# Patient Record
Sex: MALE | Race: AMERICAN INDIAN OR ALASKA NATIVE
[De-identification: names, ages, dates, MRNs, and addresses within clinical notes are randomized per-mention and may not be internally consistent; named-entity substitution may affect disease eponyms.]

---

## 2017-10-04 NOTE — CAT SCAN REPORT
CT of the cervical spine including axial, reformatted sagittal and 

coronal images.



History: Neck trauma/seizure.



Findings: There is no evidence of fracture or subluxation. Multilevel 

cervical spondylosis is present, most pronounced at C6-7. The odontoid 

is intact.



Impression: No acute findings.

## 2017-10-04 NOTE — CAT SCAN REPORT
CT HEAD WITHOUT CONTRAST:



HISTORY:  Seizure.



Serial contiguous axial images were obtained through the cranium.

Intravenous contrast material was not administered.  The ventricles are 

normal in size and appearance.  There is no mass effect or midline 

shift.  No areas of abnormally increased or decreased attenuation are 

seen.  No mass lesion is seen.



The mastoid air cells and visualized portions of the sinuses are

normal.



IMPRESSION:

Cranial CT scan within normal limits.

## 2017-10-04 NOTE — EMERGENCY DEPARTMENT REPORT
ED General Adult HPI





- General


Chief complaint: Seizure


Stated complaint: UNRESPONSIVE


Time Seen by Provider: 10/04/17 11:59


Source: patient, RN notes reviewed, old records reviewed


Mode of arrival: Stretcher


Limitations: Altered Mental Status, Other (patient is intoxicated.  He is a 

poor historian.)





- History of Present Illness


Initial comments: 





This is a 52-year-old male, the patient is previously unknown to this provider.

  The patient is stable after being found unresponsive in a hospital elevator, 

may have had a seizure.  Patient indicates that he wants detox from alcohol, 

apparently he drank earlier on today.  The patient denies homicidality and 

suicidality.  The patient denies access to guns and firearms, and he denies 

hallucinations.  He complains of central chest pain, which does not radiate to 

the back, arms or neck, denies shortness of breath, vomiting diaphoresis, there 

is no leg pain, there is no leg swelling, no recent trips, no recent surgeries.

  Patient is unable to describe his exacerbating or relieving factors.


-: unknown


Consistency: constant


Improves with: none


Worsens with: none


Associated Symptoms: confusion, chest pain, loss of appetite, weakness





- Related Data


 Previous Rx's











 Medication  Instructions  Recorded  Last Taken  Type


 


Phenytoin Sodium Extended 300 mg PO QDAY #30 capsule 10/04/17 Unknown Rx


 


chlordiazePOXIDE [Librium] 25 mg PO Q8H PRN #15 capsule 10/04/17 Unknown Rx











 Allergies











Allergy/AdvReac Type Severity Reaction Status Date / Time


 


No Known Allergies Allergy   Unverified 10/04/17 11:28














ED Review of Systems


ROS: 


Stated complaint: UNRESPONSIVE


Other details as noted in HPI





Comment: Unobtainable due to pts medical conditions





ED Past Medical Hx





- Past Medical History


Previous Medical History?: Yes


Hx Seizures: Yes





- Surgical History


Past Surgical History?: No





- Social History


Smoking Status: Current Every Day Smoker


Substance Use Type: Alcohol





- Medications


Home Medications: 


 Home Medications











 Medication  Instructions  Recorded  Confirmed  Last Taken  Type


 


Phenytoin Sodium Extended 300 mg PO QDAY #30 capsule 10/04/17  Unknown Rx


 


chlordiazePOXIDE [Librium] 25 mg PO Q8H PRN #15 capsule 10/04/17  Unknown Rx














ED Physical Exam





- General


Limitations: Altered Mental Status


General appearance: appears intoxicated





- Head


Head exam: Present: atraumatic, normocephalic





- Eye


Eye exam: Present: normal appearance, PERRL, EOMI.  Absent: nystagmus





- ENT


ENT exam: Present: normal exam, normal orophraynx, mucous membranes moist, TM's 

normal bilaterally, normal external ear exam, other (there is no nasal septal 

hematoma)





- Neck


Neck exam: Present: normal inspection, full ROM.  Absent: tenderness, 

meningismus





- Respiratory


Respiratory exam: Present: normal lung sounds bilaterally.  Absent: respiratory 

distress, wheezes, rales, rhonchi, stridor, chest wall tenderness, accessory 

muscle use, decreased breath sounds, prolonged expiratory





- Cardiovascular


Cardiovascular Exam: Present: regular rate, normal rhythm, normal heart sounds.

  Absent: bradycardia, tachycardia, irregular rhythm, systolic murmur, 

diastolic murmur, rubs, gallop





- GI/Abdominal


GI/Abdominal exam: Present: soft, normal bowel sounds.  Absent: distended, 

tenderness, guarding, rebound, rigid, pulsatile mass





- Rectal


Rectal exam: Present: deferred





- Extremities Exam


Extremities exam: Present: normal inspection, full ROM, normal capillary 

refill.  Absent: pedal edema, joint swelling, calf tenderness





- Back Exam


Back exam: Present: normal inspection, full ROM.  Absent: tenderness, CVA 

tenderness (R), CVA tenderness (L), muscle spasm, paraspinal tenderness, 

vertebral tenderness





- Neurological Exam


Neurological exam: Present: other (there is no facial droop.  There is 5 out of 

5 strength bilateral upper and lower extremities.  The tongue is midline.)





- Psychiatric


Psychiatric exam: Present: flat affect.  Absent: homicidal ideation, suicidal 

ideation





- Skin


Skin exam: Present: warm, dry, intact, normal color.  Absent: rash





ED Course


 Vital Signs











  10/04/17 10/04/17 10/04/17





  11:28 11:38 16:32


 


Temperature 98.1 F  97.9 F


 


Pulse Rate 88  97 H


 


Respiratory 18 16 15





Rate   


 


Blood Pressure 118/72  


 


Blood Pressure   97/59





[Left]   


 


O2 Sat by Pulse 95 98 100





Oximetry   














  10/04/17





  18:19


 


Temperature 98.6 F


 


Pulse Rate 75


 


Respiratory 16





Rate 


 


Blood Pressure 


 


Blood Pressure 118/72





[Left] 


 


O2 Sat by Pulse 99





Oximetry 














- Reevaluation(s)


Reevaluation #1: 





10/04/17 13:43


Differential diagnosis: Intracranial injury, cervical spine injury, alcohol 

withdrawal seizure, convulsion, electrolyte derangement, pneumonia, urinary 

tract infection











Assessment and plan: 52-year-old male who is intoxicated, was found in an 

elevator, may have had a seizure.  No active tongue fasciculations, no active 

tremors, patient is quite intoxicated at this time.  Noncontrast CT scan of the 

brain and cervical spine negative.  Laboratory studies unremarkable, x-ray of 

the chest unremarkable, urinalysis unremarkable, most likely the patient is 

simply drunk.  I see no indication for antiepileptic drug therapy at this time, 

patient will be observed on a cardiac monitor until he is clinically sober.  

Repeat EKG, 


repeat troponin ordered,





By history no pulmonary embolus or DVT risk factors, low risk by well's 

criteria.





/17 13:43





Reevaluation #2: 





10/04/17 14:45


Patient now more awake.  Requesting to eat.  Patient indicates he takes 

phenytoin/Dilantin.  Patient indicates he takes 300 mg.  Level is added.





Patient has been in the ER for a few hours without any issues with convulsion 

or seizures.  Troponins are negative 2.  Repeat EKG is morphologically 

unchanged from prior EKG.


Reevaluation #3: 





10/04/17 15:45


Troponin negative 2, repeat level and phenytoin level pending.  Blood alcohol 

level was 0.24 at approximately 12:00 PM, therefore, anticipate clinical 

sobriety at approximately 6:00 PM.  Patient sleeping comfortably, no distress.





Care is transferred to the oncoming physician, Dr. Garcia, to follow-up on 

repeat blood alcohol level, repeat troponin, and Dilantin level.  Thus far, 

patient has not had any convulsive activity.  The patient will also be 

evaluated by crisis team/mental health to evaluate for suitability for detox.





ED Medical Decision Making





- Lab Data


Result diagrams: 


 10/04/17 12:18





 10/04/17 11:47








 Vital Signs











  10/04/17 10/04/17





  11:28 11:38


 


Temperature 98.1 F 


 


Pulse Rate 88 


 


Respiratory 18 16





Rate  


 


Blood Pressure 118/72 


 


O2 Sat by Pulse 95 98





Oximetry  














 Lab Results











  10/04/17 10/04/17 10/04/17 Range/Units





  11:47 11:47 11:47 


 


WBC    9.6  (4.5-11.0)  K/mm3


 


RBC    4.89  (3.65-5.03)  M/mm3


 


Hgb    15.4 H  (11.8-15.2)  gm/dl


 


Hct    45.8 H  (35.5-45.6)  %


 


MCV    94  (84-94)  fl


 


MCH    31  (28-32)  pg


 


MCHC    34  (32-34)  %


 


RDW    16.3 H  (13.2-15.2)  %


 


Plt Count    207  (140-440)  K/mm3


 


Lymph % (Auto)    20.4  (13.4-35.0)  %


 


Mono % (Auto)    8.1 H  (0.0-7.3)  %


 


Eos % (Auto)    0.4  (0.0-4.3)  %


 


Baso % (Auto)    1.1  (0.0-1.8)  %


 


Lymph #    2.0  (1.2-5.4)  K/mm3


 


Mono #    0.8  (0.0-0.8)  K/mm3


 


Eos #    0.0  (0.0-0.4)  K/mm3


 


Baso #    0.1  (0.0-0.1)  K/mm3


 


Seg Neutrophils %    70.0  (40.0-70.0)  %


 


Seg Neutrophils #    6.7  (1.8-7.7)  K/mm3


 


PT     (12.2-14.9)  Sec.


 


INR     (0.87-1.13)  


 


APTT     (24.2-36.6)  Sec.


 


Sodium  142    (137-145)  mmol/L


 


Potassium  3.7    (3.6-5.0)  mmol/L


 


Chloride  101.9    ()  mmol/L


 


Carbon Dioxide  23    (22-30)  mmol/L


 


Anion Gap  21    mmol/L


 


BUN  12    (9-20)  mg/dL


 


Creatinine  0.7 L    (0.8-1.5)  mg/dL


 


Estimated GFR  > 60    ml/min


 


BUN/Creatinine Ratio  17    %


 


Glucose  110 H    ()  mg/dL


 


Calcium  9.0    (8.4-10.2)  mg/dL


 


Magnesium     (1.7-2.3)  mg/dL


 


Total Creatine Kinase     ()  units/L


 


Troponin T     (0.00-0.029)  ng/mL


 


Urine Color     (Yellow)  


 


Urine Turbidity     (Clear)  


 


Urine pH     (5.0-7.0)  


 


Ur Specific Gravity     (1.003-1.030)  


 


Urine Protein     (Negative)  mg/dL


 


Urine Glucose (UA)     (Negative)  mg/dL


 


Urine Ketones     (Negative)  mg/dL


 


Urine Blood     (Negative)  


 


Urine Nitrite     (Negative)  


 


Urine Bilirubin     (Negative)  


 


Urine Urobilinogen     (<2.0)  mg/dL


 


Ur Leukocyte Esterase     (Negative)  


 


Urine WBC (Auto)     (0.0-6.0)  /HPF


 


Urine RBC (Auto)     (0.0-6.0)  /HPF


 


U Epithel Cells (Auto)     (0-13.0)  /HPF


 


Urine Mucus     /HPF


 


Urine Opiates Screen     


 


Urine Methadone Screen     


 


Ur Barbiturates Screen     


 


Ur Phencyclidine Scrn     


 


Ur Amphetamines Screen     


 


U Benzodiazepines Scrn     


 


Urine Cocaine Screen     


 


U Marijuana (THC) Screen     


 


Drugs of Abuse Note     


 


Plasma/Serum Alcohol   0.24 H   (0-0.07)  gm%














  10/04/17 10/04/17 10/04/17 Range/Units





  12:03 12:03 12:18 


 


WBC    9.5  (4.5-11.0)  K/mm3


 


RBC    4.77  (3.65-5.03)  M/mm3


 


Hgb    15.3 H  (11.8-15.2)  gm/dl


 


Hct    44.3  (35.5-45.6)  %


 


MCV    93  (84-94)  fl


 


MCH    32  (28-32)  pg


 


MCHC    35 H  (32-34)  %


 


RDW    16.5 H  (13.2-15.2)  %


 


Plt Count    202  (140-440)  K/mm3


 


Lymph % (Auto)     (13.4-35.0)  %


 


Mono % (Auto)     (0.0-7.3)  %


 


Eos % (Auto)     (0.0-4.3)  %


 


Baso % (Auto)     (0.0-1.8)  %


 


Lymph #     (1.2-5.4)  K/mm3


 


Mono #     (0.0-0.8)  K/mm3


 


Eos #     (0.0-0.4)  K/mm3


 


Baso #     (0.0-0.1)  K/mm3


 


Seg Neutrophils %     (40.0-70.0)  %


 


Seg Neutrophils #     (1.8-7.7)  K/mm3


 


PT     (12.2-14.9)  Sec.


 


INR     (0.87-1.13)  


 


APTT     (24.2-36.6)  Sec.


 


Sodium     (137-145)  mmol/L


 


Potassium     (3.6-5.0)  mmol/L


 


Chloride     ()  mmol/L


 


Carbon Dioxide     (22-30)  mmol/L


 


Anion Gap     mmol/L


 


BUN     (9-20)  mg/dL


 


Creatinine     (0.8-1.5)  mg/dL


 


Estimated GFR     ml/min


 


BUN/Creatinine Ratio     %


 


Glucose     ()  mg/dL


 


Calcium     (8.4-10.2)  mg/dL


 


Magnesium     (1.7-2.3)  mg/dL


 


Total Creatine Kinase     ()  units/L


 


Troponin T     (0.00-0.029)  ng/mL


 


Urine Color  Yellow    (Yellow)  


 


Urine Turbidity  Clear    (Clear)  


 


Urine pH  5.0    (5.0-7.0)  


 


Ur Specific Gravity  1.009    (1.003-1.030)  


 


Urine Protein  <15 mg/dl    (Negative)  mg/dL


 


Urine Glucose (UA)  Neg    (Negative)  mg/dL


 


Urine Ketones  Neg    (Negative)  mg/dL


 


Urine Blood  Neg    (Negative)  


 


Urine Nitrite  Neg    (Negative)  


 


Urine Bilirubin  Neg    (Negative)  


 


Urine Urobilinogen  < 2.0    (<2.0)  mg/dL


 


Ur Leukocyte Esterase  Neg    (Negative)  


 


Urine WBC (Auto)  3.0    (0.0-6.0)  /HPF


 


Urine RBC (Auto)  4.0    (0.0-6.0)  /HPF


 


U Epithel Cells (Auto)  < 1.0    (0-13.0)  /HPF


 


Urine Mucus  Few    /HPF


 


Urine Opiates Screen   Presumptive negative   


 


Urine Methadone Screen   Presumptive negative   


 


Ur Barbiturates Screen   Presumptive negative   


 


Ur Phencyclidine Scrn   Presumptive negative   


 


Ur Amphetamines Screen   Presumptive negative   


 


U Benzodiazepines Scrn   Presumptive negative   


 


Urine Cocaine Screen   Presumptive positive   


 


U Marijuana (THC) Screen   Presumptive positive   


 


Drugs of Abuse Note   Disclamer   


 


Plasma/Serum Alcohol     (0-0.07)  gm%














  10/04/17 10/04/17 Range/Units





  12:18 12:18 


 


WBC    (4.5-11.0)  K/mm3


 


RBC    (3.65-5.03)  M/mm3


 


Hgb    (11.8-15.2)  gm/dl


 


Hct    (35.5-45.6)  %


 


MCV    (84-94)  fl


 


MCH    (28-32)  pg


 


MCHC    (32-34)  %


 


RDW    (13.2-15.2)  %


 


Plt Count    (140-440)  K/mm3


 


Lymph % (Auto)    (13.4-35.0)  %


 


Mono % (Auto)    (0.0-7.3)  %


 


Eos % (Auto)    (0.0-4.3)  %


 


Baso % (Auto)    (0.0-1.8)  %


 


Lymph #    (1.2-5.4)  K/mm3


 


Mono #    (0.0-0.8)  K/mm3


 


Eos #    (0.0-0.4)  K/mm3


 


Baso #    (0.0-0.1)  K/mm3


 


Seg Neutrophils %    (40.0-70.0)  %


 


Seg Neutrophils #    (1.8-7.7)  K/mm3


 


PT  13.6   (12.2-14.9)  Sec.


 


INR  0.99   (0.87-1.13)  


 


APTT  28.8   (24.2-36.6)  Sec.


 


Sodium    (137-145)  mmol/L


 


Potassium    (3.6-5.0)  mmol/L


 


Chloride    ()  mmol/L


 


Carbon Dioxide    (22-30)  mmol/L


 


Anion Gap    mmol/L


 


BUN    (9-20)  mg/dL


 


Creatinine    (0.8-1.5)  mg/dL


 


Estimated GFR    ml/min


 


BUN/Creatinine Ratio    %


 


Glucose    ()  mg/dL


 


Calcium    (8.4-10.2)  mg/dL


 


Magnesium   2.00  (1.7-2.3)  mg/dL


 


Total Creatine Kinase   266 H  ()  units/L


 


Troponin T   < 0.010  (0.00-0.029)  ng/mL


 


Urine Color    (Yellow)  


 


Urine Turbidity    (Clear)  


 


Urine pH    (5.0-7.0)  


 


Ur Specific Gravity    (1.003-1.030)  


 


Urine Protein    (Negative)  mg/dL


 


Urine Glucose (UA)    (Negative)  mg/dL


 


Urine Ketones    (Negative)  mg/dL


 


Urine Blood    (Negative)  


 


Urine Nitrite    (Negative)  


 


Urine Bilirubin    (Negative)  


 


Urine Urobilinogen    (<2.0)  mg/dL


 


Ur Leukocyte Esterase    (Negative)  


 


Urine WBC (Auto)    (0.0-6.0)  /HPF


 


Urine RBC (Auto)    (0.0-6.0)  /HPF


 


U Epithel Cells (Auto)    (0-13.0)  /HPF


 


Urine Mucus    /HPF


 


Urine Opiates Screen    


 


Urine Methadone Screen    


 


Ur Barbiturates Screen    


 


Ur Phencyclidine Scrn    


 


Ur Amphetamines Screen    


 


U Benzodiazepines Scrn    


 


Urine Cocaine Screen    


 


U Marijuana (THC) Screen    


 


Drugs of Abuse Note    


 


Plasma/Serum Alcohol    (0-0.07)  gm%














- EKG Data


-: EKG Interpreted by Me





- EKG Data





10/04/17 13:45


Normal sinus, 85 bpm, normal axis, normal intervals, poor R-wave progression in 

V2, not morphologically consistent with STEMI.


10/04/17 14:45


Repeat EKG is unremarkable/unchanged when compared to prior.

















- Radiology Data


Radiology results: report reviewed, image reviewed





X-ray of the chest is negative.





Noncontrast CT scan of the brain and cervical spine negative.


Critical care attestation.: 


If time is entered above; I have spent that time in minutes in the direct care 

of this critically ill patient, excluding procedure time.








ED Disposition


Clinical Impression: 


 Alcohol abuse





Disposition: DC-01 TO HOME OR SELFCARE


Is pt being admited?: No


Does the pt Need Aspirin: No


Condition: Stable


Instructions:  Alcohol Withdrawal (ED)


Additional Instructions: 


Do not drive a car or operate motor vehicles for the next 6 months.  Follow up 

with outpatient primary care doctor within the next 2 weeks.  Follow up with 

outpatient mental health resources/[facility] department/mental health 

department within the next 2 weeks.  Return to the ER right away with fevers, 

chills, chest pain, shortness of breath, intractable nausea or vomiting, 

confusion, inability to tolerate liquid feeds.  Follow up with either primary 

care or cardiology within the next 2 weeks for chest pain.











Prescriptions: 


chlordiazePOXIDE [Librium] 25 mg PO Q8H PRN #15 capsule


 PRN Reason: Alcohol Withdrawal


Phenytoin Sodium Extended 300 mg PO QDAY #30 capsule


Referrals: 


PRIMARY MD JAMES [Primary Care Provider] - 3-5 Days


ASPEN CARDOZA MD [Staff Physician] - 3-5 Days


[facility] [Provider Group] - 3-5 Days


[facility] [Provider Group] - 3-5 Days

## 2017-10-04 NOTE — EMERGENCY DEPARTMENT REPORT
Blank Doc





- Documentation


Documentation: 


I was signed out this patient by my colleague, Dr. Marcum, with the 

instructions to follow-up with a repeat troponin and blood alcohol level.  The 

blood alcohol was drawn again here around 6 PM and came back at 0.12.  It is 

now close to 7:30 and the patient will be at a level consistent with the legal 

limits.  He is seen ambulatory, AAO 3 and does not appear to be in any 

distress.  His third troponin came back negative as well.  He says that someone 

is coming to get him from the emergency department.  These reasons he appears 

safe for discharge and will be given the discharge instructions and 

prescriptions per Dr. Marcum.

## 2018-01-31 NOTE — EMERGENCY DEPARTMENT REPORT
HPI





- General


Chief Complaint: Extremity Injury, Lower


Time Seen by Provider: 01/31/18 22:24





- HPI


HPI: 


This is a 52 year-old  male presents to the emergency 

department with 2 different complaints.  First, the patient says that he has 

right ankle pain has been going on for the past week when he slipped on the ice 

and rolled his foot/ankle.  It has been getting impressively worse, it is 

swollen and he has trouble walking on it and/or bearing weight.  Secondarily, 

the patient is asking for some assistance for alcohol detox.  He says he last 

drank this morning because he was trying to treat the shakes.  He does have a 

history of seizures.  He denies any illicit drug use.  He does not have a 

primary care physician.  He was dropped off today by someone from an [facility] program.








ED Past Medical Hx





- Past Medical History


Previous Medical History?: No


Hx Seizures: Yes





- Surgical History


Past Surgical History?: No





- Social History


Smoking Status: Current Every Day Smoker


Substance Use Type: Alcohol





- Medications


Home Medications: 


 Home Medications











 Medication  Instructions  Recorded  Confirmed  Last Taken  Type


 


Phenytoin Sodium Extended 300 mg PO QDAY #30 capsule 10/04/17  Unknown Rx


 


chlordiazePOXIDE [Librium] 25 mg PO Q8H PRN #18 capsule 02/01/18  Unknown Rx














ED Review of Systems


ROS: 


Stated complaint: RIGHT ANKLE PAIN, MEDICAL CLEARANCE


Other details as noted in HPI





Comment: All other systems reviewed and negative


Constitutional: denies: chills, fever


Eyes: denies: eye pain, eye discharge, vision change


ENT: denies: ear pain, throat pain


Respiratory: denies: cough, shortness of breath, wheezing


Cardiovascular: denies: chest pain, palpitations


Gastrointestinal: denies: abdominal pain, nausea, diarrhea


Genitourinary: denies: urgency, dysuria


Musculoskeletal: joint swelling, arthralgia


Skin: denies: rash, lesions


Neurological: denies: headache, weakness, paresthesias





Physical Exam





- Physical Exam


Vital Signs: 


 Vital Signs











  01/31/18





  12:09


 


Temperature 98.9 F


 


Pulse Rate 117 H


 


Respiratory 16





Rate 


 


Blood Pressure 121/62


 


O2 Sat by Pulse 96





Oximetry 











Physical Exam: 


GENERAL: The patient is well-developed well-nourished.


HENT: Normocephalic.  Atraumatic.    Patient has moist mucous membranes.


EYES: Extraocular motions are intact.  Pupils equal reactive to light 

bilaterally.


NECK: Supple.  Trachea is midline.


CHEST/LUNGS: Clear to auscultation.  There is no respiratory distress noted.


HEART/CARDIOVASCULAR: Regular.  There is mild tachycardia.  There is no murmur.


ABDOMEN: Abdomen is soft, nontender.  Patient has normal bowel sounds.  There 

is no abdominal distention.


SKIN: Skin is warm and dry.  Mild right ankle circumferential swelling.


NEURO: The patient is awake, alert, and oriented.  The patient is cooperative.  

The patient has no focal neurologic deficits.  The patient has normal speech.


MUSCULOSKELETAL: Tenderness to palpation to the right ankle.  Decreased range 

of motion of the right foot and ankle secondary to pain.








ED Course


 Vital Signs











  01/31/18





  12:09


 


Temperature 98.9 F


 


Pulse Rate 117 H


 


Respiratory 16





Rate 


 


Blood Pressure 121/62


 


O2 Sat by Pulse 96





Oximetry 














ED Medical Decision Making





- Lab Data


Result diagrams: 


 01/31/18 23:22





 01/31/18 23:22





- Radiology Data


Radiology results: image reviewed


interpreted by me: 


X-ray of the right ankle shows a distal fibular fracture








- Medical Decision Making


Patient had 2 complaints.  Regarding the ankle pain, he has a distal fibular 

fracture.  He is neurovascularly intact.  Placed in a splint and placed on 

crutches and will be given referral for orthopedist.





Regarding his alcohol abuse and detox request, the patient was seen by the 

psych  and there is no way for us to directly sent him as an admit for 

a alcohol detox and he was given referrals for multiple different centers for 

help with his rehabilitation and/or detox.  The patient had a blood alcohol 

level of 0.19 at about 11:30 PM.  He was under the legal limit by the time he 

was discharged at 6 AM.  He had some minor tremors but was still very low on a 

CIWA score.  Since we are not able to rectally send him to a detox center, he 

was given a 4 day taper of Librium to help him with alcohol withdrawal.  He 

understands that Librium itself can be sedating and has side effects of 

respiratory depression if mixed with alcohol and that he should stop the 

medication if he decides not to continue with his detox.





Labs were otherwise unremarkable.  Patient will return to the ER with any 

worsening of symptoms or any acute distress.








- Differential Diagnosis


alcohol intoxication, alcohol withdrawal, ankle fracture, ankle sprain


Critical Care Time: No


Critical care attestation.: 


If time is entered above; I have spent that time in minutes in the direct care 

of this critically ill patient, excluding procedure time.








ED Disposition


Clinical Impression: 


 Alcohol abuse





Alcohol dependence


Qualifiers:


 Substance use status: in withdrawal Complication of substance-induced condition

: uncomplicated Qualified Code(s): F10.230 - Alcohol dependence with withdrawal

, uncomplicated





Fracture of distal fibula


Qualifiers:


 Encounter type: initial encounter Fracture type: closed Fracture morphology: 

unspecified fracture morphology Laterality: right Qualified Code(s): S82.831A - 

Other fracture of upper and lower end of right fibula, initial encounter for 

closed fracture





Disposition: DC-01 TO HOME OR SELFCARE


Is pt being admited?: No


Condition: Stable


Instructions:  Ankle Fracture (ED), Alcohol Intoxication (ED), Alcohol 

Withdrawal (ED)


Additional Instructions: 


I have given you a referral for 2 different orthopedists regarding your ankle 

fracture, Dr. Baker and the [facility] orthopedic group.  You have also been 

given 3 different referrals to help you with your alcohol dependence and 

withdrawal.  I am starting you on a short Librium taper to help you with the 

withdrawal.  This medication cannot be mixed with alcohol as it can cause 

respiratory depression and sedation.  If you are unable to stop drinking alcohol

, then do not take this medication.





Return to the emergency Department with any worsening of your symptoms or any 

acute distress.


Prescriptions: 


chlordiazePOXIDE [Librium] 25 mg PO Q8H PRN #18 capsule


 PRN Reason: Alcohol Withdrawal


Referrals: 


[facility] [Provider Group] - 3-5 Days


PRIMARY CARE,MD [Primary Care Provider] - 3-5 Days


CHRISTOPHER BAKER MD [Staff Physician] - 3-5 Days

## 2018-01-31 NOTE — XRAY REPORT
RIGHT ANKLE, 3 views: 



History: Pain and swelling.



An oblique nondisplaced fracture is identified in the distal fibula 

just proximal to the ankle joint. No calcified callus is identified. 

The remaining bony structures are intact. The ankle mortise is within 

normal limits. There is moderate lateral soft tissue swelling.



IMPRESSION:

Distal fibular fracture.

## 2021-03-09 ENCOUNTER — HOSPITAL ENCOUNTER (INPATIENT)
Dept: HOSPITAL 5 - ED | Age: 56
LOS: 2 days | Discharge: LEFT BEFORE DISCHARGE | DRG: 894 | End: 2021-03-11
Attending: INTERNAL MEDICINE | Admitting: INTERNAL MEDICINE
Payer: COMMERCIAL

## 2021-03-09 DIAGNOSIS — F10.231: Primary | ICD-10-CM

## 2021-03-09 DIAGNOSIS — R56.9: ICD-10-CM

## 2021-03-09 DIAGNOSIS — F17.200: ICD-10-CM

## 2021-03-09 DIAGNOSIS — E11.9: ICD-10-CM

## 2021-03-09 LAB
ALBUMIN SERPL-MCNC: 4.3 G/DL (ref 3.9–5)
ALT SERPL-CCNC: 16 UNITS/L (ref 7–56)
BASOPHILS # (AUTO): 0.1 K/MM3 (ref 0–0.1)
BASOPHILS NFR BLD AUTO: 1.1 % (ref 0–1.8)
BILIRUB DIRECT SERPL-MCNC: 0.2 MG/DL (ref 0–0.2)
BUN SERPL-MCNC: 9 MG/DL (ref 9–20)
BUN/CREAT SERPL: 13 %
CALCIUM SERPL-MCNC: 9.4 MG/DL (ref 8.4–10.2)
EOSINOPHIL # BLD AUTO: 0.1 K/MM3 (ref 0–0.4)
EOSINOPHIL NFR BLD AUTO: 1.6 % (ref 0–4.3)
HCT VFR BLD CALC: 45.3 % (ref 35.5–45.6)
HEMOLYSIS INDEX: 974
HGB BLD-MCNC: 15.9 GM/DL (ref 11.8–15.2)
LYMPHOCYTES # BLD AUTO: 2.1 K/MM3 (ref 1.2–5.4)
LYMPHOCYTES NFR BLD AUTO: 23.8 % (ref 13.4–35)
MCHC RBC AUTO-ENTMCNC: 35 % (ref 32–34)
MCV RBC AUTO: 98 FL (ref 84–94)
MONOCYTES # (AUTO): 0.3 K/MM3 (ref 0–0.8)
MONOCYTES % (AUTO): 3.7 % (ref 0–7.3)
PLATELET # BLD: 416 K/MM3 (ref 140–440)
RBC # BLD AUTO: 4.6 M/MM3 (ref 3.65–5.03)

## 2021-03-09 PROCEDURE — 80048 BASIC METABOLIC PNL TOTAL CA: CPT

## 2021-03-09 PROCEDURE — 80185 ASSAY OF PHENYTOIN TOTAL: CPT

## 2021-03-09 PROCEDURE — 82310 ASSAY OF CALCIUM: CPT

## 2021-03-09 PROCEDURE — 83036 HEMOGLOBIN GLYCOSYLATED A1C: CPT

## 2021-03-09 PROCEDURE — 84100 ASSAY OF PHOSPHORUS: CPT

## 2021-03-09 PROCEDURE — 80320 DRUG SCREEN QUANTALCOHOLS: CPT

## 2021-03-09 PROCEDURE — 80053 COMPREHEN METABOLIC PANEL: CPT

## 2021-03-09 PROCEDURE — 96365 THER/PROPH/DIAG IV INF INIT: CPT

## 2021-03-09 PROCEDURE — 82550 ASSAY OF CK (CPK): CPT

## 2021-03-09 PROCEDURE — 80076 HEPATIC FUNCTION PANEL: CPT

## 2021-03-09 PROCEDURE — 36415 COLL VENOUS BLD VENIPUNCTURE: CPT

## 2021-03-09 PROCEDURE — 82140 ASSAY OF AMMONIA: CPT

## 2021-03-09 PROCEDURE — 70450 CT HEAD/BRAIN W/O DYE: CPT

## 2021-03-09 PROCEDURE — 83735 ASSAY OF MAGNESIUM: CPT

## 2021-03-09 PROCEDURE — 85025 COMPLETE CBC W/AUTO DIFF WBC: CPT

## 2021-03-09 PROCEDURE — 82962 GLUCOSE BLOOD TEST: CPT

## 2021-03-09 PROCEDURE — G0480 DRUG TEST DEF 1-7 CLASSES: HCPCS

## 2021-03-09 PROCEDURE — 96375 TX/PRO/DX INJ NEW DRUG ADDON: CPT

## 2021-03-09 PROCEDURE — 82040 ASSAY OF SERUM ALBUMIN: CPT

## 2021-03-09 RX ADMIN — LEVETIRACETAM SCH MLS/HR: 100 INJECTION, SOLUTION INTRAVENOUS at 22:43

## 2021-03-09 RX ADMIN — Medication SCH ML: at 22:38

## 2021-03-09 RX ADMIN — FAMOTIDINE SCH MG: 10 INJECTION, SOLUTION INTRAVENOUS at 22:38

## 2021-03-09 NOTE — EMERGENCY DEPARTMENT REPORT
ED General Adult HPI





- General


Chief complaint: Altered Mental Status


Stated complaint: HYPOGLYCEMIA


Time Seen by Provider: 03/09/21 11:19


Source: patient


Mode of arrival: Ambulatory


Limitations: Altered Mental Status





- History of Present Illness


Initial comments: 





Patient presents to the emergency department after being found in the parking 

lot of the hospital line on the ground.  Patient states that he think he had a 

seizure because he stopped drinking this morning.  Patient states he is a heavy 

daily drinker and decided to stop drinking today cold turkey.  Patient is 

confused and having some hallucinations on initial evaluation.  It was found th

at the patient's blood glucose level was 69 upon his arrival to his room.  

Patient does have a history of seizures and takes Dilantin for his seizures.    

                                                                     


-: Sudden


Severity scale (0 -10): 0


Consistency: now resolved


Improves with: none


Worsens with: none


Associated Symptoms: denies other symptoms


Treatments Prior to Arrival: none





- Related Data


                                Home Medications











 Medication  Instructions  Recorded  Confirmed  Last Taken


 


No Known Home Medications [No  11/07/18 11/12/18 Unknown





Reported Home Medications]    











                                    Allergies











Allergy/AdvReac Type Severity Reaction Status Date / Time


 


No Known Allergies Allergy   Verified 11/06/18 16:58














ED Review of Systems


ROS: 


Stated complaint: HYPOGLYCEMIA


Other details as noted in HPI





Constitutional: denies: chills, fever


Eyes: denies: eye pain, eye discharge, vision change


ENT: denies: ear pain, throat pain


Respiratory: denies: cough, shortness of breath, wheezing


Cardiovascular: denies: chest pain, palpitations


Endocrine: no symptoms reported


Gastrointestinal: denies: abdominal pain, nausea, diarrhea


Genitourinary: denies: urgency, dysuria


Musculoskeletal: denies: back pain, joint swelling, arthralgia


Skin: denies: rash, lesions


Neurological: confusion.  denies: headache, weakness, paresthesias


Psychiatric: denies: anxiety, depression


Hematological/Lymphatic: denies: easy bleeding, easy bruising





ED Past Medical Hx





- Past Medical History


Previous Medical History?: Yes


Hx Congestive Heart Failure: No


Hx Diabetes: Yes


Hx Seizures: Yes


Hx Asthma: No


Hx COPD: No





- Surgical History


Past Surgical History?: No





- Social History


Smoking Status: Current Every Day Smoker


Substance Use Type: Alcohol, Cocaine





- Medications


Home Medications: 


                                Home Medications











 Medication  Instructions  Recorded  Confirmed  Last Taken  Type


 


No Known Home Medications [No  11/07/18 11/12/18 Unknown History





Reported Home Medications]     














ED Physical Exam





- General


Limitations: Altered Mental Status


General appearance: alert, in no apparent distress





- Head


Head exam: Present: atraumatic, normocephalic





- Eye


Eye exam: Present: normal appearance, PERRL, EOMI





- ENT


ENT exam: Present: mucous membranes moist





- Neck


Neck exam: Present: normal inspection





- Respiratory


Respiratory exam: Present: normal lung sounds bilaterally.  Absent: respiratory 

distress





- Cardiovascular


Cardiovascular Exam: Present: regular rate, normal rhythm.  Absent: systolic 

murmur, diastolic murmur, rubs, gallop





- GI/Abdominal


GI/Abdominal exam: Present: soft, normal bowel sounds.  Absent: distended, 

tenderness





- Rectal


Rectal exam: Present: deferred





- Extremities Exam


Extremities exam: Present: normal inspection





- Back Exam


Back exam: Present: normal inspection





- Neurological Exam


Neurological exam: Present: alert, CN II-XII intact, other (Patient is oriented 

to person and place but not time; patient is confused on exam but is able to 

answer questions with redirection).  Absent: motor sensory deficit





- Psychiatric


Psychiatric exam: Present: normal affect, normal mood





- Skin


Skin exam: Present: warm, dry, intact, normal color.  Absent: rash





ED Course


                                   Vital Signs











  03/09/21 03/09/21 03/09/21





  11:04 12:06 12:15


 


Temperature 98.6 F  


 


Pulse Rate 98 H  97 H


 


Respiratory 16 14 11 L





Rate   


 


Blood Pressure 98/71  90/53


 


O2 Sat by Pulse 97  94





Oximetry   














  03/09/21 03/09/21 03/09/21





  12:31 12:45 13:01


 


Temperature   


 


Pulse Rate 93 H 89 87


 


Respiratory 19 19 16





Rate   


 


Blood Pressure 90/63 97/56 89/56


 


O2 Sat by Pulse 93 93 98





Oximetry   














  03/09/21 03/09/21 03/09/21





  13:15 13:31 13:45


 


Temperature   


 


Pulse Rate 84 82 75


 


Respiratory 16 16 17





Rate   


 


Blood Pressure 96/57 100/60 108/74


 


O2 Sat by Pulse 99 99 100





Oximetry   














  03/09/21 03/09/21 03/09/21





  14:01 14:15 14:31


 


Temperature   


 


Pulse Rate 72 93 H 74


 


Respiratory 18 13 20





Rate   


 


Blood Pressure 118/82 98/67 106/69


 


O2 Sat by Pulse 99 97 99





Oximetry   














  03/09/21 03/09/21





  14:45 15:01


 


Temperature  


 


Pulse Rate 81 83


 


Respiratory 17 16





Rate  


 


Blood Pressure 90/58 91/61


 


O2 Sat by Pulse 98 98





Oximetry  














ED Medical Decision Making





- Lab Data


Result diagrams: 


                                 03/09/21 11:32





                                 03/09/21 11:32








                                   Lab Results











  03/09/21 03/09/21 03/09/21 Range/Units





  11:32 11:32 11:32 


 


WBC    8.8  (4.5-11.0)  K/mm3


 


RBC    4.60  (3.65-5.03)  M/mm3


 


Hgb    15.9 H  (11.8-15.2)  gm/dl


 


Hct    45.3  (35.5-45.6)  %


 


MCV    98 H  (84-94)  fl


 


MCH    35 H  (28-32)  pg


 


MCHC    35 H  (32-34)  %


 


RDW    14.8  (13.2-15.2)  %


 


Plt Count    416  (140-440)  K/mm3


 


Lymph % (Auto)    23.8  (13.4-35.0)  %


 


Mono % (Auto)    3.7  (0.0-7.3)  %


 


Eos % (Auto)    1.6  (0.0-4.3)  %


 


Baso % (Auto)    1.1  (0.0-1.8)  %


 


Lymph # (Auto)    2.1  (1.2-5.4)  K/mm3


 


Mono # (Auto)    0.3  (0.0-0.8)  K/mm3


 


Eos # (Auto)    0.1  (0.0-0.4)  K/mm3


 


Baso # (Auto)    0.1  (0.0-0.1)  K/mm3


 


Seg Neutrophils %    69.8  (40.0-70.0)  %


 


Seg Neutrophils #    6.1  (1.8-7.7)  K/mm3


 


Sodium  132 L    (137-145)  mmol/L


 


Potassium  TNR    


 


Chloride  98.0    ()  mmol/L


 


Carbon Dioxide  17 L    (22-30)  mmol/L


 


Anion Gap  26    mmol/L


 


BUN  9    (9-20)  mg/dL


 


Creatinine  0.7 L    (0.8-1.3)  mg/dL


 


Estimated GFR  > 60    ml/min


 


BUN/Creatinine Ratio  13    %


 


Glucose  73 L    ()  mg/dL


 


Calcium  9.4    (8.4-10.2)  mg/dL


 


Magnesium  2.20    (1.7-2.3)  mg/dL


 


Total Bilirubin  0.50    (0.1-1.2)  mg/dL


 


Direct Bilirubin  0.2    (0-0.2)  mg/dL


 


Indirect Bilirubin  0.3    mg/dL


 


AST  77 H    (5-40)  units/L


 


ALT  16    (7-56)  units/L


 


Alkaline Phosphatase  76    ()  units/L


 


Ammonia   22.0 L   (25-60)  umol/L


 


Total Creatine Kinase     ()  units/L


 


Total Protein  8.5 H    (6.3-8.2)  g/dL


 


Albumin  4.3    (3.9-5)  g/dL


 


Albumin/Globulin Ratio  1.0    %


 


Phenytoin     (10.0-20.0)  ug/mL


 


Plasma/Serum Alcohol     (0-0.07)  %














  03/09/21 03/09/21 03/09/21 Range/Units





  11:32 11:32 11:32 


 


WBC     (4.5-11.0)  K/mm3


 


RBC     (3.65-5.03)  M/mm3


 


Hgb     (11.8-15.2)  gm/dl


 


Hct     (35.5-45.6)  %


 


MCV     (84-94)  fl


 


MCH     (28-32)  pg


 


MCHC     (32-34)  %


 


RDW     (13.2-15.2)  %


 


Plt Count     (140-440)  K/mm3


 


Lymph % (Auto)     (13.4-35.0)  %


 


Mono % (Auto)     (0.0-7.3)  %


 


Eos % (Auto)     (0.0-4.3)  %


 


Baso % (Auto)     (0.0-1.8)  %


 


Lymph # (Auto)     (1.2-5.4)  K/mm3


 


Mono # (Auto)     (0.0-0.8)  K/mm3


 


Eos # (Auto)     (0.0-0.4)  K/mm3


 


Baso # (Auto)     (0.0-0.1)  K/mm3


 


Seg Neutrophils %     (40.0-70.0)  %


 


Seg Neutrophils #     (1.8-7.7)  K/mm3


 


Sodium     (137-145)  mmol/L


 


Potassium     


 


Chloride     ()  mmol/L


 


Carbon Dioxide     (22-30)  mmol/L


 


Anion Gap     mmol/L


 


BUN     (9-20)  mg/dL


 


Creatinine     (0.8-1.3)  mg/dL


 


Estimated GFR     ml/min


 


BUN/Creatinine Ratio     %


 


Glucose     ()  mg/dL


 


Calcium     (8.4-10.2)  mg/dL


 


Magnesium     (1.7-2.3)  mg/dL


 


Total Bilirubin     (0.1-1.2)  mg/dL


 


Direct Bilirubin     (0-0.2)  mg/dL


 


Indirect Bilirubin     mg/dL


 


AST     (5-40)  units/L


 


ALT     (7-56)  units/L


 


Alkaline Phosphatase     ()  units/L


 


Ammonia     (25-60)  umol/L


 


Total Creatine Kinase  199 H    ()  units/L


 


Total Protein     (6.3-8.2)  g/dL


 


Albumin     (3.9-5)  g/dL


 


Albumin/Globulin Ratio     %


 


Phenytoin    1.3 L  (10.0-20.0)  ug/mL


 


Plasma/Serum Alcohol   0.22 H   (0-0.07)  %














  03/09/21 Range/Units





  12:30 


 


WBC   (4.5-11.0)  K/mm3


 


RBC   (3.65-5.03)  M/mm3


 


Hgb   (11.8-15.2)  gm/dl


 


Hct   (35.5-45.6)  %


 


MCV   (84-94)  fl


 


MCH   (28-32)  pg


 


MCHC   (32-34)  %


 


RDW   (13.2-15.2)  %


 


Plt Count   (140-440)  K/mm3


 


Lymph % (Auto)   (13.4-35.0)  %


 


Mono % (Auto)   (0.0-7.3)  %


 


Eos % (Auto)   (0.0-4.3)  %


 


Baso % (Auto)   (0.0-1.8)  %


 


Lymph # (Auto)   (1.2-5.4)  K/mm3


 


Mono # (Auto)   (0.0-0.8)  K/mm3


 


Eos # (Auto)   (0.0-0.4)  K/mm3


 


Baso # (Auto)   (0.0-0.1)  K/mm3


 


Seg Neutrophils %   (40.0-70.0)  %


 


Seg Neutrophils #   (1.8-7.7)  K/mm3


 


Sodium   (137-145)  mmol/L


 


Potassium   


 


Chloride   ()  mmol/L


 


Carbon Dioxide   (22-30)  mmol/L


 


Anion Gap   mmol/L


 


BUN   (9-20)  mg/dL


 


Creatinine   (0.8-1.3)  mg/dL


 


Estimated GFR   ml/min


 


BUN/Creatinine Ratio   %


 


Glucose   ()  mg/dL


 


Calcium   (8.4-10.2)  mg/dL


 


Magnesium   (1.7-2.3)  mg/dL


 


Total Bilirubin   (0.1-1.2)  mg/dL


 


Direct Bilirubin   (0-0.2)  mg/dL


 


Indirect Bilirubin   mg/dL


 


AST   (5-40)  units/L


 


ALT   (7-56)  units/L


 


Alkaline Phosphatase   ()  units/L


 


Ammonia   (25-60)  umol/L


 


Total Creatine Kinase  102  ()  units/L


 


Total Protein   (6.3-8.2)  g/dL


 


Albumin   (3.9-5)  g/dL


 


Albumin/Globulin Ratio   %


 


Phenytoin   (10.0-20.0)  ug/mL


 


Plasma/Serum Alcohol   (0-0.07)  %














- Radiology Data


Radiology results: report reviewed





- Medical Decision Making





The patient has hallucinations on exam as well as tremors for alcohol withdrawal


CIWA protocol initiated in the first CIWA score was a 10


Patient had a seizure while receiving CT scan of the head


Patient was given doses of Ativan for alcohol withdrawal


IV fluids and banana bag were also given


Patient placed on 2013


Critical Care Time: Yes


Critical care time in (mins) excluding proc time.: 35


Critical care attestation.: 


If time is entered above; I have spent that time in minutes in the direct care 

of this critically ill patient, excluding procedure time.








ED Disposition


Clinical Impression: 


 Alcohol withdrawal delirium, acute, mixed level of activity, Alcohol withdrawal

seizure





Disposition: DC-09 OP ADMIT IP TO THIS HOSP


Is pt being admited?: Yes


Does the pt Need Aspirin: No


Condition: Fair


Referrals: 


PRIMARY CARE,MD [Primary Care Provider] - 3-5 Days

## 2021-03-09 NOTE — HISTORY AND PHYSICAL REPORT
History of Present Illness


Date of examination: 03/09/21


Date of admission: 


03/09/21 15:51





Chief complaint: 





Seizures in the hospital parking not this a.m.


History of present illness: 


55-year-old male with no significant past medical history except alcohol 

dependence brought in because of seizures in the hospital parking lot.  Patient 

tried not having alcohol for the last 24 hours and has withdrawal symptoms.  

Patient is a heavy drinker.  Patient with withdrawal symptoms and is tremulous. 

Patient thinks he had seizures because he stopped drinking. Patient blood 

glucose was 58 at time of arrival.  Patient has a history of seizures but is 

noncompliant.  Patient has diabetes no fever or chills.  No exposure to 

coronavirus.








- Past Medical History


Previous Medical History?: Yes


--Diabetes: Yes


--Seizures: Yes








- Surgical History 


-- No





- Social History  


Smoking Status: Current Every Day Smoker


Substance Use Type: Alcohol, Cocaine  





Family history


 Htn





- Medications


Home Medications: 


                                Home Medications











 Medication  Instructions  Recorded  Confirmed  Last Taken  Type


 


No Known Home Medications [No  11/07/18 11/12/18 Unknown History





Reported Home Medications]     














Review of Systems


ROS: 


Stated complaint: HYPOGLYCEMIA


Other details as noted in HPI





Constitutional: denies: chills, fever


Eyes: denies: eye pain, eye discharge, vision change


ENT: denies: ear pain, throat pain


Respiratory: denies: cough, shortness of breath, wheezing


Cardiovascular: denies: chest pain, palpitations


Endocrine: no symptoms reported


Gastrointestinal: denies: abdominal pain, nausea, diarrhea


Genitourinary: denies: urgency, dysuria


Musculoskeletal: denies: back pain, joint swelling, arthralgia


Skin: denies: rash, lesions


Neurological: confusion.  denies: headache, weakness, paresthesias


Psychiatric: denies: anxiety, depression


Hematological/Lymphatic: denies: easy bleeding, easy bruising














Medications and Allergies


                                    Allergies











Allergy/AdvReac Type Severity Reaction Status Date / Time


 


No Known Allergies Allergy   Verified 11/06/18 16:58











                                Home Medications











 Medication  Instructions  Recorded  Confirmed  Last Taken  Type


 


No Known Home Medications [No  11/07/18 11/12/18 Unknown History





Reported Home Medications]     











Active Meds: 


Active Medications





Chlordiazepoxide HCl (Chlordiazepoxide 25 Mg Cap)  50 mg PO Q1H PRN


   PRN Reason: CIWA-Ar 8-15


Chlordiazepoxide HCl (Chlordiazepoxide 25 Mg Cap)  100 mg PO Q1H PRN


   PRN Reason: CIWA-Ar 16-25


Sodium Chloride (Nacl 0.9% 1000 Ml)  1,000 mls @ 125 mls/hr IV AS DIRECT MARIEL


Lorazepam (Lorazepam 2 Mg/Ml Vial)  2 mg IV Q1H PRN


   PRN Reason: CIWA-Ar 8-15


   Last Admin: 03/09/21 17:40 Dose:  2 mg


   Documented by: 


Lorazepam (Lorazepam 2 Mg/Ml Vial)  4 mg IV Q1H PRN


   PRN Reason: CIWA-Ar 16-25


Lorazepam (Lorazepam 2 Mg/Ml Vial)  4 mg IV Q15MIN PRN


   PRN Reason: CIWA-Ar >25











Exam





- Constitutional


Vitals: 


                                        











Temp Pulse Resp BP Pulse Ox


 


 98.6 F   97 H  22   115/54   97 


 


 03/09/21 11:04  03/09/21 20:00  03/09/21 20:00  03/09/21 20:13  03/09/21 20:00











General appearance: Present: mild distress, well-nourished





- EENT


Eyes: Present: PERRL


ENT: hearing intact, clear oral mucosa





- Neck


Neck: Present: supple, normal ROM





- Respiratory


Respiratory effort: normal


Respiratory: bilateral: CTA





- Cardiovascular


Heart rate: 98


Rhythm: regular


Heart Sounds: Present: S1 & S2.  Absent: rub, click





- Extremities


Extremities: pulses symmetrical, No edema


Peripheral Pulses: within normal limits





- Abdominal


General gastrointestinal: Present: soft, non-tender, non-distended, normal bowel

sounds


Male genitourinary: Present: normal





- Rectal


Rectal Exam: deferred





- Integumentary


Integumentary: Present: clear, warm, dry





- Musculoskeletal


Musculoskeletal: gait normal, strength equal bilaterally





- Psychiatric


Psychiatric: appropriate mood/affect, intact judgment & insight, agitated, other

(Tremulous)





- Neurologic


Neurologic: CNII-XII intact, moves all extremities





- Allied Health


Allied health notes reviewed: nursing, case management





Results





- Labs


CBC & Chem 7: 


                                 03/10/21 04:40





                                 03/10/21 04:40


Labs: 


                             Laboratory Last Values











WBC  8.8 K/mm3 (4.5-11.0)   03/09/21  11:32    


 


RBC  4.60 M/mm3 (3.65-5.03)   03/09/21  11:32    


 


Hgb  15.9 gm/dl (11.8-15.2)  H  03/09/21  11:32    


 


Hct  45.3 % (35.5-45.6)   03/09/21  11:32    


 


MCV  98 fl (84-94)  H  03/09/21  11:32    


 


MCH  35 pg (28-32)  H  03/09/21  11:32    


 


MCHC  35 % (32-34)  H  03/09/21  11:32    


 


RDW  14.8 % (13.2-15.2)   03/09/21  11:32    


 


Plt Count  416 K/mm3 (140-440)   03/09/21  11:32    


 


Lymph % (Auto)  23.8 % (13.4-35.0)   03/09/21  11:32    


 


Mono % (Auto)  3.7 % (0.0-7.3)   03/09/21  11:32    


 


Eos % (Auto)  1.6 % (0.0-4.3)   03/09/21  11:32    


 


Baso % (Auto)  1.1 % (0.0-1.8)   03/09/21  11:32    


 


Lymph # (Auto)  2.1 K/mm3 (1.2-5.4)   03/09/21  11:32    


 


Mono # (Auto)  0.3 K/mm3 (0.0-0.8)   03/09/21  11:32    


 


Eos # (Auto)  0.1 K/mm3 (0.0-0.4)   03/09/21  11:32    


 


Baso # (Auto)  0.1 K/mm3 (0.0-0.1)   03/09/21  11:32    


 


Seg Neutrophils %  69.8 % (40.0-70.0)   03/09/21  11:32    


 


Seg Neutrophils #  6.1 K/mm3 (1.8-7.7)   03/09/21  11:32    


 


Sodium  132 mmol/L (137-145)  L  03/09/21  11:32    


 


Potassium  TNR   03/09/21  11:32    


 


Chloride  98.0 mmol/L ()   03/09/21  11:32    


 


Carbon Dioxide  17 mmol/L (22-30)  L  03/09/21  11:32    


 


Anion Gap  26 mmol/L  03/09/21  11:32    


 


BUN  9 mg/dL (9-20)   03/09/21  11:32    


 


Creatinine  0.7 mg/dL (0.8-1.3)  L  03/09/21  11:32    


 


Estimated GFR  > 60 ml/min  03/09/21  11:32    


 


BUN/Creatinine Ratio  13 %  03/09/21  11:32    


 


Glucose  73 mg/dL ()  L  03/09/21  11:32    


 


POC Glucose  69 mg/dL ()  L  03/09/21  11:03    


 


Calcium  9.4 mg/dL (8.4-10.2)   03/09/21  11:32    


 


Magnesium  2.20 mg/dL (1.7-2.3)   03/09/21  11:32    


 


Total Bilirubin  0.50 mg/dL (0.1-1.2)   03/09/21  11:32    


 


Direct Bilirubin  0.2 mg/dL (0-0.2)   03/09/21  11:32    


 


Indirect Bilirubin  0.3 mg/dL  03/09/21  11:32    


 


AST  77 units/L (5-40)  H  03/09/21  11:32    


 


ALT  16 units/L (7-56)   03/09/21  11:32    


 


Alkaline Phosphatase  76 units/L ()   03/09/21  11:32    


 


Ammonia  22.0 umol/L (25-60)  L  03/09/21  11:32    


 


Total Creatine Kinase  102 units/L ()   03/09/21  12:30    


 


Total Protein  8.5 g/dL (6.3-8.2)  H  03/09/21  11:32    


 


Albumin  4.3 g/dL (3.9-5)   03/09/21  11:32    


 


Albumin/Globulin Ratio  1.0 %  03/09/21  11:32    


 


Phenytoin  1.3 ug/mL (10.0-20.0)  L  03/09/21  11:32    


 


Plasma/Serum Alcohol  0.22 % (0-0.07)  H  03/09/21  11:32    








                                    Short CBC











  03/09/21 03/10/21 Range/Units





  11:32 04:40 


 


WBC  8.8  5.5  (4.5-11.0)  K/mm3


 


Hgb  15.9 H  13.3  (11.8-15.2)  gm/dl


 


Hct  45.3  38.9  D  (35.5-45.6)  %


 


Plt Count  416  282  (140-440)  K/mm3








                                       BMP











  03/09/21 03/09/21 03/10/21





  11:32 22:58 04:40


 


Sodium  132 L   143  D


 


Potassium  TNR   4.3


 


Chloride  98.0   111.5 H


 


Carbon Dioxide  17 L   22


 


BUN  9   12


 


Creatinine  0.7 L   0.9


 


Glucose  73 L   95


 


Calcium  9.4  8.5  8.5








                                 Cardiac Enzymes











  03/09/21 03/09/21 Range/Units





  11:32 12:30 


 


Total Creatine Kinase  199 H  102  ()  units/L








                                 Liver Function











  03/09/21 03/09/21 03/10/21 Range/Units





  11:32 22:58 04:40 


 


Total Bilirubin  0.50   0.60  (0.1-1.2)  mg/dL


 


Direct Bilirubin  0.2    (0-0.2)  mg/dL


 


AST  77 H   24  (5-40)  units/L


 


ALT  16   15  (7-56)  units/L


 


Alkaline Phosphatase  76   78  ()  units/L


 


Albumin  4.3  3.5 L  3.7 L  (3.9-5)  g/dL














Assessment and Plan


Advance Directives: Yes (Full code)


VTE prophylaxis?: Chemical


Plan of care discussed with patient/family: Yes





- Patient Problems


(1) Alcohol withdrawal delirium, acute, mixed level of activity


Current Visit: Yes   Status: Acute   


Plan to address problem: 


CIWA protocol initiated


IV fluids initiated








(2) Alcohol withdrawal seizure


Current Visit: Yes   Status: Acute   


Qualifiers: 


   Qualified Code(s): F10.239 - Alcohol dependence with withdrawal, unspecified;

R56.9 - Unspecified convulsions   


Plan to address problem: 


Patient initiated on Keppra  every 12


Patient to be bridged to Keppra


Orally


Patient was on phenytoin and the level was very subtherapeutic around 1.3


No apparent discharge patient to be counseled to take Keppra on a regular basis 

and avoid alcohol


Mental health consult for EtOH dependence


EtOH level 0.22








(3) Dehydration


Current Visit: Yes   Status: Acute   


Plan to address problem: 


IV fluids for now








(4) DVT prophylaxis


Current Visit: Yes   Status: Acute   


Plan to address problem: 


On heparin and GI prophylaxis

## 2021-03-09 NOTE — CAT SCAN REPORT
CT BRAIN: 3/9/2021



INDICATION / CLINICAL INFORMATION:

syncope.



COMPARISON: 

11/12/2018



FINDINGS:



BRAIN/INTRACRANIAL STRUCTURES: Unenhanced CT images of the brain demonstrate no evidence of acute int
racranial abnormality.



Ventricles and sulci are prominent in size, consistent with diffuse cerebral atrophy, more prominent 
than is typically seen in a patient of this age. This is not changed when compared to the prior exam.




White matter hypoattenuation is present throughout the cerebral hemispheric white matter, with more f
ocal hypoattenuation present in the left parietal and right frontal lobes. The appearance is consiste
nt with chronic small vessel ischemic change, and has not changed when compared to the prior exam.



There is no CT evidence of acute large vessel territory ischemic injury, hemorrhage, or mass. There a
re no abnormal extra-axial fluid collections.



At this chronic vascular calcifications are noted.





EXTRACRANIAL STRUCTURES: Unremarkable.







IMPRESSION:

 No acute abnormality. Stable chronic and age-related changes.









All CT scans at this location are performed using dose reduction to ALARA by means of automated expos
ure control.



Signer Name: Travis Wagner MD 

Signed: 3/9/2021 1:14 PM

Workstation Name: [id number]

## 2021-03-10 LAB
ALBUMIN SERPL-MCNC: 3.5 G/DL (ref 3.9–5)
ALBUMIN SERPL-MCNC: 3.7 G/DL (ref 3.9–5)
ALT SERPL-CCNC: 15 UNITS/L (ref 7–56)
BASOPHILS # (AUTO): 0 K/MM3 (ref 0–0.1)
BASOPHILS NFR BLD AUTO: 0.2 % (ref 0–1.8)
BUN SERPL-MCNC: 12 MG/DL (ref 9–20)
BUN/CREAT SERPL: 13 %
CALCIUM SERPL-MCNC: 8.5 MG/DL (ref 8.4–10.2)
CALCIUM SERPL-MCNC: 8.5 MG/DL (ref 8.4–10.2)
EOSINOPHIL # BLD AUTO: 0.2 K/MM3 (ref 0–0.4)
EOSINOPHIL NFR BLD AUTO: 4.2 % (ref 0–4.3)
HCT VFR BLD CALC: 38.9 % (ref 35.5–45.6)
HEMOLYSIS INDEX: 7
HGB BLD-MCNC: 13.3 GM/DL (ref 11.8–15.2)
LYMPHOCYTES # BLD AUTO: 1.6 K/MM3 (ref 1.2–5.4)
LYMPHOCYTES NFR BLD AUTO: 28.4 % (ref 13.4–35)
MCHC RBC AUTO-ENTMCNC: 34 % (ref 32–34)
MCV RBC AUTO: 99 FL (ref 84–94)
MONOCYTES # (AUTO): 0.3 K/MM3 (ref 0–0.8)
MONOCYTES % (AUTO): 5.4 % (ref 0–7.3)
PLATELET # BLD: 282 K/MM3 (ref 140–440)
RBC # BLD AUTO: 3.93 M/MM3 (ref 3.65–5.03)

## 2021-03-10 RX ADMIN — Medication SCH ML: at 10:57

## 2021-03-10 RX ADMIN — FAMOTIDINE SCH MG: 10 INJECTION, SOLUTION INTRAVENOUS at 10:57

## 2021-03-10 RX ADMIN — FAMOTIDINE SCH MG: 10 INJECTION, SOLUTION INTRAVENOUS at 21:35

## 2021-03-10 RX ADMIN — LEVETIRACETAM SCH MLS/HR: 100 INJECTION, SOLUTION INTRAVENOUS at 21:35

## 2021-03-10 RX ADMIN — LEVETIRACETAM SCH MLS/HR: 100 INJECTION, SOLUTION INTRAVENOUS at 11:15

## 2021-03-10 NOTE — PROGRESS NOTE
Assessment and Plan


Assessment and plan: 





(1) Alcohol withdrawal delirium, acute, mixed level of activity


Current Visit: Yes   Status: Acute   


Plan to address problem: 


CIWA protocol initiated


IV fluids initiated








(2) Alcohol withdrawal seizure


Current Visit: Yes   Status: Acute   


Qualifiers: 


   Qualified Code(s): F10.239 - Alcohol dependence with withdrawal, unspecified;

R56.9 - Unspecified convulsions   


Plan to address problem: 


Patient initiated on Keppra  every 12


Patient to be bridged to Keppra Orally


Patient was on phenytoin and the level was very subtherapeutic around 1.3


No apparent discharge patient to be counseled to take Keppra on a regular basis 

and avoid alcohol


Mental health consult for EtOH dependence


EtOH level 0.22








(3) Dehydration


Current Visit: Yes   Status: Acute   


Plan to address problem: 


IV fluids for now








(4) DVT prophylaxis


Current Visit: Yes   Status: Acute   


Plan to address problem: 


On heparin and GI prophylaxis





3/10/2021


-Patient is on DT and continue CIWA protocol


-Patient has shaking


-Alcohol withdrawal seizure and currently on Keppra.





History


Interval history: 





Patient was seen and evaluated this morning


Patient was shaking, agitated


Alert and oriented





Hospitalist Physical





- Physical exam


Narrative exam: 





 Not in cardiopulmonary distress. 


 The patient appeared well nourished and normally developed.


 Vital signs as documented.


 Head exam is unremarkable.


 No scleral icterus .


 Neck is without jugular venous distension, thyromegaly, or carotid bruits. 


 Lungs are clear to auscultation.


Cardiac exam reveals regular rate and  Rhythm. 


Abdominal exam reveals normal bowel sounds, nontender, no organomegaly.


Extremities are nonedematous and both femoral and pedal pulses are normal.


CNS: Alert and oriented 3.  No focal weakness.





- Constitutional


Vitals: 


                                        











Temp Pulse Resp BP Pulse Ox


 


 97.1 F L  69   20   123/73   96 


 


 03/10/21 08:11  03/10/21 04:38  03/10/21 08:11  03/10/21 08:11  03/10/21 04:38











General appearance: Present: mild distress, well-nourished





Results





- Labs


CBC & Chem 7: 


                                 03/10/21 04:40





                                 03/10/21 04:40


Labs: 


                             Laboratory Last Values











WBC  5.5 K/mm3 (4.5-11.0)   03/10/21  04:40    


 


RBC  3.93 M/mm3 (3.65-5.03)   03/10/21  04:40    


 


Hgb  13.3 gm/dl (11.8-15.2)   03/10/21  04:40    


 


Hct  38.9 % (35.5-45.6)  D 03/10/21  04:40    


 


MCV  99 fl (84-94)  H  03/10/21  04:40    


 


MCH  34 pg (28-32)  H  03/10/21  04:40    


 


MCHC  34 % (32-34)   03/10/21  04:40    


 


RDW  14.3 % (13.2-15.2)   03/10/21  04:40    


 


Plt Count  282 K/mm3 (140-440)   03/10/21  04:40    


 


Lymph % (Auto)  28.4 % (13.4-35.0)   03/10/21  04:40    


 


Mono % (Auto)  5.4 % (0.0-7.3)   03/10/21  04:40    


 


Eos % (Auto)  4.2 % (0.0-4.3)   03/10/21  04:40    


 


Baso % (Auto)  0.2 % (0.0-1.8)   03/10/21  04:40    


 


Lymph # (Auto)  1.6 K/mm3 (1.2-5.4)   03/10/21  04:40    


 


Mono # (Auto)  0.3 K/mm3 (0.0-0.8)   03/10/21  04:40    


 


Eos # (Auto)  0.2 K/mm3 (0.0-0.4)   03/10/21  04:40    


 


Baso # (Auto)  0.0 K/mm3 (0.0-0.1)   03/10/21  04:40    


 


Seg Neutrophils %  61.8 % (40.0-70.0)   03/10/21  04:40    


 


Seg Neutrophils #  3.4 K/mm3 (1.8-7.7)   03/10/21  04:40    


 


Sodium  143 mmol/L (137-145)  D 03/10/21  04:40    


 


Potassium  4.3 mmol/L (3.6-5.0)   03/10/21  04:40    


 


Chloride  111.5 mmol/L ()  H  03/10/21  04:40    


 


Carbon Dioxide  22 mmol/L (22-30)   03/10/21  04:40    


 


Anion Gap  14 mmol/L  03/10/21  04:40    


 


BUN  12 mg/dL (9-20)   03/10/21  04:40    


 


Creatinine  0.9 mg/dL (0.8-1.3)   03/10/21  04:40    


 


Estimated GFR  > 60 ml/min  03/10/21  04:40    


 


BUN/Creatinine Ratio  13 %  03/10/21  04:40    


 


Glucose  95 mg/dL ()   03/10/21  04:40    


 


POC Glucose  92 mg/dL ()   03/09/21  20:44    


 


Hemoglobin A1c  4.9 % (4-6)   03/10/21  04:40    


 


Calcium  8.5 mg/dL (8.4-10.2)   03/10/21  04:40    


 


Phosphorus  2.60 mg/dL (2.5-4.5)   03/09/21  22:58    


 


Magnesium  1.90 mg/dL (1.7-2.3)   03/09/21  22:58    


 


Total Bilirubin  0.60 mg/dL (0.1-1.2)   03/10/21  04:40    


 


Direct Bilirubin  0.2 mg/dL (0-0.2)   03/09/21  11:32    


 


Indirect Bilirubin  0.3 mg/dL  03/09/21  11:32    


 


AST  24 units/L (5-40)   03/10/21  04:40    


 


ALT  15 units/L (7-56)   03/10/21  04:40    


 


Alkaline Phosphatase  78 units/L ()   03/10/21  04:40    


 


Ammonia  22.0 umol/L (25-60)  L  03/09/21  11:32    


 


Total Creatine Kinase  102 units/L ()   03/09/21  12:30    


 


Total Protein  5.9 g/dL (6.3-8.2)  L D 03/10/21  04:40    


 


Albumin  3.7 g/dL (3.9-5)  L  03/10/21  04:40    


 


Albumin/Globulin Ratio  1.7 %  03/10/21  04:40    


 


Phenytoin  1.3 ug/mL (10.0-20.0)  L  03/09/21  11:32    


 


Plasma/Serum Alcohol  < 0.01 % (0-0.07)   03/10/21  04:40    











Verma/IV: 


                                        





Voiding Method                   Urinal











Active Medications





- Current Medications


Current Medications: 














Generic Name Dose Route Start Last Admin





  Trade Name Freq  PRN Reason Stop Dose Admin


 


Acetaminophen  650 mg  03/09/21 21:27 





  Acetaminophen 325 Mg Tab  PO  





  Q4H PRN  





  Pain MILD(1-3)/Fever >100.5/HA  


 


Chlordiazepoxide HCl  50 mg  03/09/21 11:19 





  Chlordiazepoxide 25 Mg Cap  PO  





  Q1H PRN  





  CIWA-Ar 8-15  


 


Chlordiazepoxide HCl  100 mg  03/09/21 11:19 





  Chlordiazepoxide 25 Mg Cap  PO  





  Q1H PRN  





  CIWA-Ar 16-25  


 


Famotidine  20 mg  03/09/21 22:00  03/09/21 22:38





  Famotidine 20 Mg/2 Ml Inj  IV   20 mg





  BID MARIEL   Administration


 


Hydromorphone HCl  0.5 mg  03/09/21 21:27 





  Hydromorphone 1 Mg/1 Ml Inj  IV  





  Q3H PRN  





  Pain , Severe (7-10)  


 


Sodium Chloride  1,000 mls @ 125 mls/hr  03/09/21 15:30  03/09/21 22:38





  Nacl 0.9% 1000 Ml  IV   125 mls/hr





  AS DIRECT MARIEL   Administration


 


Levetiracetam 750 mg/ Dextrose  107.5 mls @ 400 mls/hr  03/09/21 22:00  03/09/21

22:43





  IV   400 mls/hr





  Q12HR MARIEL   Administration


 


Lorazepam  2 mg  03/09/21 11:19  03/09/21 17:40





  Lorazepam 2 Mg/Ml Vial  IV   2 mg





  Q1H PRN   Administration





  CIWA-Ar 8-15  


 


Lorazepam  4 mg  03/09/21 11:19 





  Lorazepam 2 Mg/Ml Vial  IV  





  Q1H PRN  





  CIWA-Ar 16-25  


 


Lorazepam  4 mg  03/09/21 11:19 





  Lorazepam 2 Mg/Ml Vial  IV  





  Q15MIN PRN  





  CIWA-Ar >25  


 


Metoclopramide HCl  10 mg  03/09/21 21:27 





  Metoclopramide 10 Mg/2 Ml Inj  IV  





  Q6H PRN  





  Nausea And Vomiting  


 


Ondansetron HCl  4 mg  03/09/21 21:27 





  Ondansetron 4 Mg/2 Ml Inj  IV  





  Q8H PRN  





  Nausea And Vomiting  


 


Oxycodone/Acetaminophen  1 tab  03/09/21 21:27 





  Oxycodone /Acetaminophen 5-325mg Tab  PO  





  Q6H PRN  





  Pain, Moderate (4-6)  


 


Sodium Chloride  10 ml  03/09/21 22:00  03/09/21 22:38





  Sodium Chloride 0.9% 10 Ml Flush Syringe  IV   10 ml





  BID MARIEL   Administration


 


Sodium Chloride  10 ml  03/09/21 21:27 





  Sodium Chloride 0.9% 10 Ml Flush Syringe  IV  





  PRN PRN  





  LINE FLUSH

## 2021-03-11 VITALS — SYSTOLIC BLOOD PRESSURE: 123 MMHG | DIASTOLIC BLOOD PRESSURE: 84 MMHG

## 2021-03-11 LAB
ALBUMIN SERPL-MCNC: 3.6 G/DL (ref 3.9–5)
ALT SERPL-CCNC: 16 UNITS/L (ref 7–56)
BUN SERPL-MCNC: 10 MG/DL (ref 9–20)
BUN/CREAT SERPL: 11 %
CALCIUM SERPL-MCNC: 8.6 MG/DL (ref 8.4–10.2)
HEMOLYSIS INDEX: 3

## 2021-03-11 RX ADMIN — Medication SCH ML: at 01:55

## 2021-03-11 NOTE — DISCHARGE SUMMARY
Providers





- Providers


Date of Admission: 


03/09/21 15:51





Date of discharge: 03/11/21


Attending physician: 


DEL ALVARES MD





Primary care physician: 


PRIMARY CARE MD








Hospitalization


Reason for admission: Alcohol withdrawal DT


Condition: Fair


Hospital course: 





History of present illness: 


55-year-old male with no significant past medical history except alcohol 

dependence brought in because of seizures in the hospital parking lot.  Patient 

tried not having alcohol for the last 24 hours and has withdrawal symptoms.  

Patient is a heavy drinker.  Patient with withdrawal symptoms and is tremulous. 

Patient thinks he had seizures because he stopped drinking. Patient blood 

glucose was 58 at time of arrival.  Patient has a history of seizures but is 

noncompliant.  Patient has diabetes no fever or chills.  No exposure to 

coronavirus.





Hospital course


===========


-Patient was admitted and treated for alcohol withdrawal delirium tremens 

according to alcohol withdrawal protocol.  Patient did not have any seizure 

after admission.  He had tremors yesterday.  But this morning he does not have 

any tremor patient was calm and cooperative.  Patient was alert and oriented.  

He understands the risk benefits of going AMA and he signed AMA.  I told him 

patient may develop alcohol withdrawal seizure and he understands the risk.  

Patient said he has history of alcohol withdrawal before.  Patient judgment was 

okay.  There is no reason to keep him AGAINST MEDICAL ADVICE because patient 

knows what he is doing. 





Disposition: DC-07 LEFT AGAINST MED ADVICE


Time spent for discharge: 35 minutes





- Discharge Diagnoses


(1) Alcohol withdrawal delirium, acute, mixed level of activity


Status: Acute   





(2) Alcohol withdrawal seizure


Status: Acute   


Qualifiers: 


   Qualified Code(s): F10.239 - Alcohol dependence with withdrawal, unspecified;

R56.9 - Unspecified convulsions   





(3) EtOH dependence


Status: Chronic   





Core Measure Documentation





- Palliative Care


Palliative Care/ Comfort Measures: Not Applicable





- Core Measures


Any of the following diagnoses?: none





Exam





- Physical Exam


Narrative exam: 





 Not in cardiopulmonary distress. 


 The patient appeared well nourished and normally developed.


 Vital signs as documented.


 Head exam is unremarkable.


 No scleral icterus .


 Neck is without jugular venous distension, thyromegaly, or carotid bruits. 


 Lungs are clear to auscultation.


Cardiac exam reveals regular rate and  Rhythm. 


Abdominal exam reveals normal bowel sounds, nontender, no organomegaly.


Extremities are nonedematous and both femoral and pedal pulses are normal.


CNS: Alert and oriented 3.  No focal weakness.





- Constitutional


Vitals: 


                                        











Temp Pulse Resp BP Pulse Ox


 


 97.5 F L  79   18   123/84   99 


 


 03/11/21 07:34  03/11/21 07:34  03/11/21 07:34  03/11/21 07:34  03/11/21 07:34














Plan


Activity: no restrictions


Weight Bearing Status: Full Weight Bearing


Diet: regular


Follow up with: 


PRIMARY CARE,MD [Primary Care Provider] - 3-5 Days


Forms:  AMA Form

## 2022-01-04 ENCOUNTER — HOSPITAL ENCOUNTER (EMERGENCY)
Dept: HOSPITAL 5 - ED | Age: 57
Discharge: LEFT BEFORE DISCHARGE | End: 2022-01-04
Payer: SELF-PAY

## 2022-01-04 VITALS — SYSTOLIC BLOOD PRESSURE: 133 MMHG | DIASTOLIC BLOOD PRESSURE: 82 MMHG

## 2022-01-04 DIAGNOSIS — E11.9: ICD-10-CM

## 2022-01-04 DIAGNOSIS — F17.200: ICD-10-CM

## 2022-01-04 DIAGNOSIS — G40.909: Primary | ICD-10-CM

## 2022-01-04 PROCEDURE — 99282 EMERGENCY DEPT VISIT SF MDM: CPT

## 2022-01-04 NOTE — EMERGENCY DEPARTMENT REPORT
ED Seizure HPI





- General


Chief Complaint: Seizure


Stated Complaint: SEIZURE


Time Seen by Provider: 01/04/22 22:18


Source: patient, EMS


Mode of arrival: Stretcher


Limitations: Altered Mental Status





- History of Present Illness


Initial Comments: 





Patient is 56 years old male with history of seizure on Dilantin.  Patient also 

had history of alcohol withdrawal seizure before.  Patient brought to the 

emergency room via EMS for evaluation of 1 episode of generalized tonic clonic 

seizure.  Upon arrival to the ER patient is alert, oriented x3 in no acute dis

tress no injury.  Patient stated that he is out of his Dilantin for 

approximately 1 month now.  Patient stated that he still drinking but he is not 

drinking daily.  He said last drink was yesterday.  Patient denied any tremor, 

visual hallucination, suicidal ideation or homicidal ideation.


MD Complaint: seizure


-: Sudden


Description of Episode: loss of consciousness, tonic-clonic movement, post-event

confusion


Witnessed:: Yes


Trauma: No


Seizure History: known seizure disorder


Possible Precipitating Event: none


Associated Symptoms: denies other symptoms


Treatments Prior to Arrival: none





- Related Data


                                Home Medications











 Medication  Instructions  Recorded  Confirmed  Last Taken


 


No Known Home Medications [No  11/07/18 11/12/18 Unknown





Reported Home Medications]    











                                    Allergies











Allergy/AdvReac Type Severity Reaction Status Date / Time


 


No Known Allergies Allergy   Verified 11/06/18 16:58














ED Review of Systems


ROS: 


Stated complaint: SEIZURE


Other details as noted in HPI





Comment: All other systems reviewed and negative


Constitutional: denies: chills, fever


Respiratory: denies: cough, shortness of breath, SOB with exertion


Cardiovascular: denies: chest pain, palpitations


Gastrointestinal: denies: abdominal pain, nausea


Musculoskeletal: denies: back pain


Neurological: denies: headache, weakness, numbness, paresthesias, confusion


Psychiatric: denies: depression, auditory hallucinations, visual hallucinations,

homicidal thoughts, suicidal thoughts





ED Past Medical Hx





- Past Medical History


Hx Congestive Heart Failure: No


Hx Diabetes: Yes


Hx Seizures: Yes


Hx Asthma: No


Hx COPD: No





- Surgical History


Past Surgical History?: No





- Social History


Smoking Status: Current Every Day Smoker


Substance Use Type: Alcohol, Cocaine





- Medications


Home Medications: 


                                Home Medications











 Medication  Instructions  Recorded  Confirmed  Last Taken  Type


 


No Known Home Medications [No  11/07/18 11/12/18 Unknown History





Reported Home Medications]     














ED Physical Exam





- General


Limitations: Altered Mental Status


General appearance: alert, in no apparent distress





- Head


Head exam: Present: atraumatic, normocephalic, normal inspection





- Eye


Eye exam: Present: normal appearance





- ENT


ENT exam: Present: normal exam, normal orophraynx, mucous membranes moist





- Neck


Neck exam: Present: normal inspection, full ROM.  Absent: tenderness, 

meningismus





- Respiratory


Respiratory exam: Present: normal lung sounds bilaterally





- Cardiovascular


Cardiovascular Exam: Present: regular rate, normal rhythm, normal heart sounds





- GI/Abdominal


GI/Abdominal exam: Present: soft, normal bowel sounds.  Absent: distended, 

tenderness, guarding, rebound, rigid, organomegaly, mass, bruit, pulsatile mass,

hernia





- Extremities Exam


Extremities exam: Present: normal inspection, full ROM, normal capillary refill.

 Absent: tenderness





- Back Exam


Back exam: Absent: CVA tenderness (R), CVA tenderness (L)





- Neurological Exam


Neurological exam: Present: alert, oriented X3, CN II-XII intact





- Psychiatric


Psychiatric exam: Present: normal mood.  Absent: homicidal ideation, suicidal 

ideation





- Skin


Skin exam: Present: warm, intact, normal color





ED Course


                                   Vital Signs











  01/04/22





  21:56


 


Temperature 98.4 F


 


Pulse Rate 100 H


 


Respiratory 18





Rate 


 


Blood Pressure 133/82





[Left] 


 


O2 Sat by Pulse 99





Oximetry 











Critical care attestation.: 


If time is entered above; I have spent that time in minutes in the direct care 

of this critically ill patient, excluding procedure time.








ED Disposition


Clinical Impression: 


 Seizure





Disposition: 07 LEFT AWOL/ELOPED


Is pt being admited?: No


Condition: Undetermined


Referrals: 


PRIMARY CARE,MD [Primary Care Provider] - 3-5 Days 19-Sep-2021 21:25

## 2022-01-05 ENCOUNTER — HOSPITAL ENCOUNTER (EMERGENCY)
Dept: HOSPITAL 5 - ED | Age: 57
Discharge: LEFT BEFORE DISCHARGE | End: 2022-01-05
Payer: SELF-PAY

## 2022-01-05 VITALS — DIASTOLIC BLOOD PRESSURE: 90 MMHG | SYSTOLIC BLOOD PRESSURE: 150 MMHG

## 2022-01-05 DIAGNOSIS — G40.909: Primary | ICD-10-CM

## 2022-01-05 DIAGNOSIS — F17.200: ICD-10-CM

## 2022-01-05 DIAGNOSIS — E11.9: ICD-10-CM

## 2022-01-05 PROCEDURE — 99283 EMERGENCY DEPT VISIT LOW MDM: CPT

## 2022-01-05 NOTE — EMERGENCY DEPARTMENT REPORT
HPI





- General


Chief Complaint: Seizure


Time Seen by Provider: 01/05/22 06:16





- HPI


HPI: 





[address]








The patient is a 56-year-old male present with a chief complaint of seizures.  

Patient has a history epilepsy and states has been off his Dilantin for 1 month.

 Patient initially came in after seizure but left after refusing treatment.  

Patient went across the street to the gas station where he had a witnessed 

tonic-clonic seizure and was brought back to the ED.  Patient currently denies 

complaints





ED Past Medical Hx





- Past Medical History


Hx Diabetes: Yes


Hx Seizures: Yes


Hx Asthma: No


Hx COPD: No





- Surgical History


Past Surgical History?: No





- Family History


Family history: no significant





- Social History


Smoking Status: Current Every Day Smoker


Substance Use Type: Alcohol, Cocaine





- Medications


Home Medications: 


                                Home Medications











 Medication  Instructions  Recorded  Confirmed  Last Taken  Type


 


No Known Home Medications [No  11/07/18 11/12/18 Unknown History





Reported Home Medications]     














ED Review of Systems


ROS: 


Stated complaint: SEIZURE AND FALL


Other details as noted in HPI





Constitutional: no symptoms reported


Eyes: denies: eye pain


ENT: denies: throat pain


Respiratory: no symptoms reported


Cardiovascular: denies: chest pain


Endocrine: no symptoms reported


Gastrointestinal: denies: abdominal pain


Musculoskeletal: denies: back pain


Neurological: denies: headache





Physical Exam





- Physical Exam


Vital Signs: 


                                   Vital Signs











  01/05/22





  03:15


 


Temperature 98.3 F


 


Pulse Rate 100 H


 


Respiratory 16





Rate 


 


Blood Pressure 150/90





[Left] 


 


O2 Sat by Pulse 100





Oximetry 











Physical Exam: 





GENERAL: The patient is well-developed well-nourished male lying on stretcher 

resting company not appearing to be in acute distress. []


HEENT: Normocephalic.  Atraumatic.  Extraocular motions are intact.  Patient has

 moist mucous membranes.


NECK: Supple.  Trachea midline


CHEST/LUNGS:  There is no respiratory distress noted.


HEART/CARDIOVASCULAR: Regular.  There is no tachycardia. 


SKIN: There is no rash.  There is no edema.  There is no diaphoresis.


NEURO: The patient is asleep but awakens to voice and light touch and is 

oriented.  The patient is cooperative.  The patient has no focal neurologic 

deficits.  The patient has normal speech


MUSCULOSKELETAL: There is no evidence of acute injury.





ED Course


                                   Vital Signs











  01/05/22





  03:15


 


Temperature 98.3 F


 


Pulse Rate 100 H


 


Respiratory 16





Rate 


 


Blood Pressure 150/90





[Left] 


 


O2 Sat by Pulse 100





Oximetry 














- Reevaluation(s)


Reevaluation #1: 





01/05/22 07:54


Patient refusing all treatment and labs leaving AMA





ED Medical Decision Making





- Differential Diagnosis


Seizure


Critical care attestation.: 


If time is entered above; I have spent that time in minutes in the direct care 

of this critically ill patient, excluding procedure time.








ED Disposition


Clinical Impression: 


 Seizure





Disposition: 07 LEFT AGAINST MEDICAL ADVICE


Is pt being admited?: No


Does the pt Need Aspirin: No


Condition: Undetermined


Referrals: 


PRIMARY CARE,MD [Primary Care Provider] - 3-5 Days


Time of Disposition: 07:54 (Patient leaving AMA)